# Patient Record
Sex: MALE | Race: BLACK OR AFRICAN AMERICAN | NOT HISPANIC OR LATINO | Employment: OTHER | URBAN - METROPOLITAN AREA
[De-identification: names, ages, dates, MRNs, and addresses within clinical notes are randomized per-mention and may not be internally consistent; named-entity substitution may affect disease eponyms.]

---

## 2018-01-26 ENCOUNTER — HOSPITAL ENCOUNTER (EMERGENCY)
Facility: HOSPITAL | Age: 48
Discharge: HOME | End: 2018-01-26
Attending: EMERGENCY MEDICINE
Payer: MEDICAID

## 2018-01-26 VITALS
OXYGEN SATURATION: 98 % | HEART RATE: 105 BPM | SYSTOLIC BLOOD PRESSURE: 165 MMHG | TEMPERATURE: 98 F | HEIGHT: 65 IN | WEIGHT: 145 LBS | BODY MASS INDEX: 24.16 KG/M2 | RESPIRATION RATE: 18 BRPM | DIASTOLIC BLOOD PRESSURE: 114 MMHG
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
## 2018-01-26 PROCEDURE — 99283 EMERGENCY DEPT VISIT LOW MDM: CPT
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
## 2018-02-21 ENCOUNTER — HOSPITAL ENCOUNTER (EMERGENCY)
Facility: HOSPITAL | Age: 48
Discharge: HOME | End: 2018-02-21
Attending: EMERGENCY MEDICINE
Payer: MEDICAID

## 2018-02-21 VITALS
DIASTOLIC BLOOD PRESSURE: 75 MMHG | RESPIRATION RATE: 18 BRPM | OXYGEN SATURATION: 97 % | TEMPERATURE: 98 F | HEART RATE: 80 BPM | SYSTOLIC BLOOD PRESSURE: 103 MMHG

## 2018-02-21 DIAGNOSIS — S00.93XA CONTUSION OF HEAD, UNSPECIFIED PART OF HEAD, INITIAL ENCOUNTER: ICD-10-CM

## 2018-02-21 DIAGNOSIS — S01.112A LACERATION OF LEFT EYEBROW, INITIAL ENCOUNTER: Primary | ICD-10-CM

## 2018-02-21 PROCEDURE — 25000003 PHARM REV CODE 250: Performed by: EMERGENCY MEDICINE

## 2018-02-21 PROCEDURE — 99283 EMERGENCY DEPT VISIT LOW MDM: CPT | Mod: 25

## 2018-02-21 PROCEDURE — 12051 INTMD RPR FACE/MM 2.5 CM/<: CPT

## 2018-02-21 RX ORDER — OLANZAPINE 10 MG/1
10 TABLET ORAL NIGHTLY
COMMUNITY

## 2018-02-21 RX ORDER — OXYBUTYNIN CHLORIDE 15 MG/1
5 TABLET, EXTENDED RELEASE ORAL DAILY
COMMUNITY

## 2018-02-21 RX ORDER — QUETIAPINE FUMARATE 50 MG/1
50 TABLET, FILM COATED ORAL NIGHTLY
COMMUNITY

## 2018-02-21 RX ORDER — VALPROIC ACID 250 MG/5ML
SOLUTION ORAL 3 TIMES DAILY
COMMUNITY

## 2018-02-21 RX ORDER — PHENYTOIN 125 MG/5ML
SUSPENSION ORAL 3 TIMES DAILY
COMMUNITY

## 2018-02-21 RX ORDER — LIDOCAINE HYDROCHLORIDE 10 MG/ML
10 INJECTION INFILTRATION; PERINEURAL
Status: COMPLETED | OUTPATIENT
Start: 2018-02-21 | End: 2018-02-21

## 2018-02-21 RX ADMIN — LIDOCAINE HYDROCHLORIDE 10 ML: 10 INJECTION, SOLUTION INFILTRATION; PERINEURAL at 12:02
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
## 2018-04-02 ENCOUNTER — HOSPITAL ENCOUNTER (EMERGENCY)
Facility: HOSPITAL | Age: 48
Discharge: HOME | End: 2018-04-02
Attending: EMERGENCY MEDICINE
Payer: MEDICAID

## 2018-04-02 VITALS
DIASTOLIC BLOOD PRESSURE: 96 MMHG | HEART RATE: 121 BPM | SYSTOLIC BLOOD PRESSURE: 141 MMHG | RESPIRATION RATE: 20 BRPM | BODY MASS INDEX: 22.01 KG/M2 | TEMPERATURE: 99 F | WEIGHT: 132.25 LBS | OXYGEN SATURATION: 93 %

## 2018-04-02 DIAGNOSIS — R53.1 WEAKNESS: Primary | ICD-10-CM

## 2018-04-02 PROCEDURE — 99284 EMERGENCY DEPT VISIT MOD MDM: CPT

## 2018-04-02 NOTE — ED PROVIDER NOTES
"SCRIBE #1 NOTE: I, Laura Jag, am scribing for, and in the presence of, South Shannon MD. I have scribed the entire note.      History      Chief Complaint   Patient presents with    Extremity Weakness     pt's caregiver states pt has "generalized weakness, worse on the right side and tachycardia," x1 day       Review of patient's allergies indicates:   Allergen Reactions    Penicillins         HPI   HPI    4/2/2018, 6:21 PM   History obtained from the family member      History of Present Illness: Trey Armneta is a 47 y.o. male patient with PMHx of mental disorder and developmental delay presents to the Emergency Department for weakness which onset gradually this morning. Symptoms are constant and mild in severity. Weakness is located to R upper and lower extremities. Family member reports that pt walks with a walker at home. Pt's family member reports that today at pt's day program ([facility]), reported that pt was showing signs of R sided weakness. No associated sxs reported. HPI limited because pt is non verbal. No further complaints or concerns at this time.     Arrival mode: Personal vehicle    PCP: Josemanuel Pichardo Jr, MD       Past Medical History:  Past Medical History:   Diagnosis Date    Bipolar 1 disorder     Developmental delay     Mental disorder     Mental retardation     Movement disorder     Seizures        Past Surgical History:  No past surgical history on file.      Family History:  No family history on file.    Social History:  Social History     Social History Main Topics    Smoking status: Never Smoker    Smokeless tobacco: Not given    Alcohol use No    Drug use: No    Sexual activity: Not given       ROS   Review of Systems   Unable to perform ROS: Patient nonverbal       Physical Exam      Initial Vitals [04/02/18 1650]   BP Pulse Resp Temp SpO2   (!) 141/96 (!) 121 20 99.3 °F (37.4 °C) (!) 93 %      MAP       111          Physical Exam  Vital signs " and nursing notes reviewed.  Constitutional: Patient is in no acute distress. Baseline.  Eyes: Normal sclera.  ENT: Moist mucosa.  Cardiovascular: Regular rate. Regular rhythm. No murmurs, rubs, or gallops. Distal pulses are 2+ and symmetric  Pulmonary/Chest: No respiratory distress.  Musculoskeletal: Moves all extremities.  Neurological:  Alert, awake. At Baseline per caregiver.  Equal  strength bilaterally. 5/5  Skin: Dry and nl in appearance.  Psychological: Nl affect.      ED Course    Procedures  ED Vital Signs:  Vitals:    04/02/18 1650   BP: (!) 141/96   Pulse: (!) 121   Resp: 20   Temp: 99.3 °F (37.4 °C)   TempSrc: Oral   SpO2: (!) 93%   Weight: 60 kg (132 lb 4.4 oz)       Abnormal Lab Results:  Labs Reviewed - No data to display     All Lab Results:  None     Imaging Results:  Imaging Results          CT Head Without Contrast (Final result)  Result time 04/02/18 18:08:19    Final result by Mireya Kenny MD (04/02/18 18:08:19)                 Impression:     No acute findings.    All CT scans at this facility use dose modulation, iterative reconstruction, and/or weight based dosing when appropriate to reduce radiation dose to as low as reasonably achievable.      Electronically signed by: MIREYA KENNY MD  Date:     04/02/18  Time:    18:08              Narrative:    Exam: Noncontrast Ct scan of the Head    History: Right sided weakness    Findings: No intracranial hemorrhage or acute findings are demonstrated. The visualized paranasal sinuses are clear. The calvarium is intact.                                       The Emergency Provider reviewed the vital signs and test results, which are outlined above.    ED Discussion       6:38 PM: Discussed with pt's guardian all pertinent ED information and results. Discussed pt dx and plan of tx. Gave pt's guardian all f/u and return to the ED instructions. All questions and concerns were addressed at this time. Pt's guardian express understanding of information  and instructions, and is comfortable with plan to discharge. Pt is stable for discharge.    I discussed with patient and/or family/caretaker that evaluation in the ED does not suggest any emergent or life threatening medical conditions requiring immediate intervention beyond what was provided in the ED, and I believe patient is safe for discharge.  Regardless, an unremarkable evaluation in the ED does not preclude the development or presence of a serious of life threatening condition. As such, patient was instructed to return immediately for any worsening or change in current symptoms.        ED Medication(s):  Medications - No data to display    Discharge Medication List as of 4/2/2018  6:29 PM          Follow-up Information     Josemanuel Pichardo Jr, MD.    Specialty:  Internal Medicine  Contact information:  [address] [facility] [address] [phone]                     Medical Decision Making    Medical Decision Making:   Clinical Tests:   Radiological Study: Ordered and Reviewed           Scribe Attestation:   Scribe #1: I performed the above scribed service and the documentation accurately describes the services I performed. I attest to the accuracy of the note.    Attending:   Physician Attestation Statement for Scribe #1: I, South Shannon MD, personally performed the services described in this documentation, as scribed by Laura Rm, in my presence, and it is both accurate and complete.          Clinical Impression       ICD-10-CM ICD-9-CM   1. Weakness R53.1 780.79       Disposition:   Disposition: Discharged  Condition: Stable           South Shannon MD  04/02/18 2010

## 2018-12-05 ENCOUNTER — HOSPITAL ENCOUNTER (EMERGENCY)
Facility: HOSPITAL | Age: 48
Discharge: HOME | End: 2018-12-05
Attending: EMERGENCY MEDICINE
Payer: MEDICAID

## 2018-12-05 VITALS
DIASTOLIC BLOOD PRESSURE: 77 MMHG | BODY MASS INDEX: 26.99 KG/M2 | SYSTOLIC BLOOD PRESSURE: 126 MMHG | WEIGHT: 162.19 LBS | RESPIRATION RATE: 16 BRPM | HEART RATE: 82 BPM | OXYGEN SATURATION: 94 % | TEMPERATURE: 98 F

## 2018-12-05 DIAGNOSIS — S90.511A ABRASION OF RIGHT ANKLE, INITIAL ENCOUNTER: ICD-10-CM

## 2018-12-05 DIAGNOSIS — S93.491A SPRAIN OF OTHER LIGAMENT OF RIGHT ANKLE, INITIAL ENCOUNTER: ICD-10-CM

## 2018-12-05 DIAGNOSIS — M25.571 RIGHT ANKLE PAIN: ICD-10-CM

## 2018-12-05 DIAGNOSIS — M79.671 RIGHT FOOT PAIN: Primary | ICD-10-CM

## 2018-12-05 LAB
ALBUMIN SERPL BCP-MCNC: 3.2 G/DL
ALP SERPL-CCNC: 65 U/L
ALT SERPL W/O P-5'-P-CCNC: 25 U/L
ANION GAP SERPL CALC-SCNC: 10 MMOL/L
AST SERPL-CCNC: 34 U/L
BASOPHILS # BLD AUTO: 0.01 K/UL
BASOPHILS NFR BLD: 0.1 %
BILIRUB SERPL-MCNC: 0.3 MG/DL
BUN SERPL-MCNC: 15 MG/DL
CALCIUM SERPL-MCNC: 8.8 MG/DL
CHLORIDE SERPL-SCNC: 109 MMOL/L
CO2 SERPL-SCNC: 25 MMOL/L
CREAT SERPL-MCNC: 1.2 MG/DL
DIFFERENTIAL METHOD: ABNORMAL
EOSINOPHIL # BLD AUTO: 0.1 K/UL
EOSINOPHIL NFR BLD: 0.8 %
ERYTHROCYTE [DISTWIDTH] IN BLOOD BY AUTOMATED COUNT: 14.1 %
EST. GFR  (AFRICAN AMERICAN): >60 ML/MIN/1.73 M^2
EST. GFR  (NON AFRICAN AMERICAN): >60 ML/MIN/1.73 M^2
GLUCOSE SERPL-MCNC: 109 MG/DL
HCT VFR BLD AUTO: 41.7 %
HGB BLD-MCNC: 14.4 G/DL
LYMPHOCYTES # BLD AUTO: 1.9 K/UL
LYMPHOCYTES NFR BLD: 24.7 %
MCH RBC QN AUTO: 33.4 PG
MCHC RBC AUTO-ENTMCNC: 34.5 G/DL
MCV RBC AUTO: 97 FL
MONOCYTES # BLD AUTO: 0.7 K/UL
MONOCYTES NFR BLD: 9.7 %
NEUTROPHILS # BLD AUTO: 4.9 K/UL
NEUTROPHILS NFR BLD: 64.7 %
PLATELET # BLD AUTO: 128 K/UL
PMV BLD AUTO: 12.3 FL
POTASSIUM SERPL-SCNC: 4.1 MMOL/L
PROT SERPL-MCNC: 6.9 G/DL
RBC # BLD AUTO: 4.31 M/UL
SODIUM SERPL-SCNC: 144 MMOL/L
WBC # BLD AUTO: 7.53 K/UL

## 2018-12-05 PROCEDURE — 99284 EMERGENCY DEPT VISIT MOD MDM: CPT | Mod: 25

## 2018-12-05 PROCEDURE — 36415 COLL VENOUS BLD VENIPUNCTURE: CPT

## 2018-12-05 PROCEDURE — 80053 COMPREHEN METABOLIC PANEL: CPT

## 2018-12-05 PROCEDURE — 85025 COMPLETE CBC W/AUTO DIFF WBC: CPT

## 2018-12-06 NOTE — DISCHARGE INSTRUCTIONS
Keep wound clean and dry with bulky dressing. Watch for signs of infection, redness, drainage, pain, swelling.

## 2018-12-06 NOTE — ED PROVIDER NOTES
SCRIBE #1 NOTE: I, Aida Hawkins, am scribing for, and in the presence of, Lucía Barreto NP. I have scribed the entire note.       History     Chief Complaint   Patient presents with    Gait Problem     pt is nonverbal due to seizures and MR. Castillo is coming from group home. Group home employee reports that pt has been leaning to the right since monday and they noticed a cut on his right ankle. Requesting check up.     Review of patient's allergies indicates:   Allergen Reactions    Penicillins          History of Present Illness     HPI    12/5/2018, 8:09 PM  History obtained from the caretaker at pt's Group Home  HPI limited secondary to patient being nonverbal      History of Present Illness: Trey Armenta is a 48 y.o. male patient with PMhx of developmental delay and seizures from a Group Home who presents to the Emergency Department for evaluation of a gait problem. Pt's caregiver states that patient began leaning to the R while ambulating today. The group home staff noticed the abrasion to his lateral R ankle 2 days ago but are not sure if patient is leaning to the R secondary to ankle pain. The caretaker does not know how patient scraped his ankle. Pt has frequent falls secondary to balance issues but has fallen more today. He is complaint with Dilantin and Depakene which he takes for seizures and to the caretaker's knowledge, he has not had a seizure recently.       Arrival mode: Personal vehicle     PCP: Josemanuel Pichardo Jr, MD        Past Medical History:  Past Medical History:   Diagnosis Date    Bipolar 1 disorder     Developmental delay     Mental disorder     Mental retardation     Movement disorder     Seizures        Past Surgical History:  History reviewed. No pertinent past surgical history.    Family History:  History reviewed. No pertinent family history.    Social History:  Social History     Tobacco Use    Smoking status: Never Smoker   Substance and Sexual Activity    Alcohol use:  No    Drug use: No    Sexual activity: Unknown        Review of Systems     Review of Systems   Unable to perform ROS: Patient nonverbal   Skin: Positive for wound (abrasion to R lateral ankle).      Physical Exam     Initial Vitals [12/05/18 1955]   BP Pulse Resp Temp SpO2   130/84 87 16 97.5 °F (36.4 °C) 95 %      MAP       --          Physical Exam  Nursing Notes and Vital Signs Reviewed.  Constitutional: Patient is in no acute distress. Well-nourished.  Head: Atraumatic. Normocephalic.  Eyes:No scleral icterus.  ENT: Mucous membranes are moist.    Neck: Supple.   Cardiovascular: Regular rate. Regular rhythm. No murmurs, rubs, or gallops. Distal pulses are 2+ and symmetric.  Pulmonary/Chest: No respiratory distress. Clear to auscultation bilaterally. No wheezing or rales.  Abdominal: Non-distended. Good bowel sounds.  Musculoskeletal: Moves all extremities equally.  R foot/ankle: There is R foot swelling. No obvious deformity. No bony tenderness over navicular.  No tenderness over the base of the 5th metatarsal.  No proximal fibular tenderness. He is able to bear weight. Sensation is normal. Distal capillary refill takes less than 2 seconds.  PT and DP pulses are 2+ bilaterally.  Skin: Warm and dry. Abrasion over the R lateral ankle.  Neurological:  Awake. At baseline mental status, per caretaker. Nonverbal at baseline. Able to follow commands. No acute focal neurological deficits are appreciated.  Psychiatric: Normal affect. Good eye contact.     ED Course   Orthopedic Injury  Date/Time: 12/5/2018 9:55 PM  Performed by: Lucía Barreto NP  Authorized by: Renetta Leahy Do, MD     Consent Done?:  Yes  Universal Protocol:     Consent given by: caretaker.  Injury:     Injury location:  Foot    Location details:  Right foot    Injury type:  Soft tissue      Pre-procedure assessment:     Neurovascular status: Neurovascularly intact      Distal perfusion: normal      Neurological function: normal      Range of  motion: normal      Local anesthesia used?: No      Patient sedated?: No        Selections made in this section will also lock the Injury type section above.:     Immobilization: post-op shoe.    Complications: No      Specimens: No      Implants: No    Post-procedure assessment:     Neurovascular status: Neurovascularly intact      Distal perfusion: normal      Neurological function: normal      Range of motion: splinted      Patient tolerance:  Patient tolerated the procedure well with no immediate complications      ED Vital Signs:  Vitals:    12/05/18 1955 12/05/18 2002   BP: 130/84    Pulse: 87    Resp: 16    Temp: 97.5 °F (36.4 °C)    TempSrc: Axillary    SpO2: 95%    Weight:  73.6 kg (162 lb 3.2 oz)       Abnormal Lab Results:  Labs Reviewed   CBC W/ AUTO DIFFERENTIAL - Abnormal; Notable for the following components:       Result Value    RBC 4.31 (*)     MCH 33.4 (*)     Platelets 128 (*)     All other components within normal limits   COMPREHENSIVE METABOLIC PANEL - Abnormal; Notable for the following components:    Albumin 3.2 (*)     All other components within normal limits        All Lab Results:  Results for orders placed or performed during the hospital encounter of 12/05/18   CBC auto differential   Result Value Ref Range    WBC 7.53 3.90 - 12.70 K/uL    RBC 4.31 (L) 4.60 - 6.20 M/uL    Hemoglobin 14.4 14.0 - 18.0 g/dL    Hematocrit 41.7 40.0 - 54.0 %    MCV 97 82 - 98 fL    MCH 33.4 (H) 27.0 - 31.0 pg    MCHC 34.5 32.0 - 36.0 g/dL    RDW 14.1 11.5 - 14.5 %    Platelets 128 (L) 150 - 350 K/uL    MPV 12.3 9.2 - 12.9 fL    Gran # (ANC) 4.9 1.8 - 7.7 K/uL    Lymph # 1.9 1.0 - 4.8 K/uL    Mono # 0.7 0.3 - 1.0 K/uL    Eos # 0.1 0.0 - 0.5 K/uL    Baso # 0.01 0.00 - 0.20 K/uL    Gran% 64.7 38.0 - 73.0 %    Lymph% 24.7 18.0 - 48.0 %    Mono% 9.7 4.0 - 15.0 %    Eosinophil% 0.8 0.0 - 8.0 %    Basophil% 0.1 0.0 - 1.9 %    Differential Method Automated    Comprehensive metabolic panel   Result Value Ref Range     Sodium 144 136 - 145 mmol/L    Potassium 4.1 3.5 - 5.1 mmol/L    Chloride 109 95 - 110 mmol/L    CO2 25 23 - 29 mmol/L    Glucose 109 70 - 110 mg/dL    BUN, Bld 15 6 - 20 mg/dL    Creatinine 1.2 0.5 - 1.4 mg/dL    Calcium 8.8 8.7 - 10.5 mg/dL    Total Protein 6.9 6.0 - 8.4 g/dL    Albumin 3.2 (L) 3.5 - 5.2 g/dL    Total Bilirubin 0.3 0.1 - 1.0 mg/dL    Alkaline Phosphatase 65 55 - 135 U/L    AST 34 10 - 40 U/L    ALT 25 10 - 44 U/L    Anion Gap 10 8 - 16 mmol/L    eGFR if African American >60 >60 mL/min/1.73 m^2    eGFR if non African American >60 >60 mL/min/1.73 m^2         Imaging Results:  Imaging Results          CT Head Without Contrast (Final result)  Result time 12/05/18 21:22:00    Final result by Thai Leon MD (12/05/18 21:22:00)                 Impression:      1.  Negative for acute intracranial process. Negative for hemorrhage, or skull fracture.    2.  Stable findings as noted above.    All CT scans at this facility are performed  using dose modulation techniques as appropriate to performed exam including the following:  automated exposure control; adjustment of mA and/or kV according to the patients size (this includes techniques or standardized protocols for targeted exams where dose is matched to indication/reason for exam: i.e. extremities or head);  iterative reconstruction technique.      Electronically signed by: Thai Leon MD  Date:    12/05/2018  Time:    21:22             Narrative:    EXAMINATION:  CT HEAD WITHOUT CONTRAST    CLINICAL HISTORY:  leaning to right; transient alteration of awareness    TECHNIQUE:  Axial images through the brain and posterior fossa were obtained without the use of IV contrast.  Sagittal and coronal reconstructions are provided for review.    COMPARISON:  April 2, 2018    FINDINGS:  Motion artifact is present on a number of images.  Subtle abnormalities could be overlooked.  The ventricles are midline and the CSF spaces are normal for age with slight  asymmetry again seen, left greater than right.  The gray-white matter junction is well preserved. Negative for intracranial vascular abnormalities. Negative for mass, mass effect, cerebral edema, hemorrhage or abnormal fluid collections.  Intracranial atherosclerotic disease.    The skull and scalp are intact.    The   paranasal sinuses, mastoid air cells, middle ears and ear canals are clear. The globes are intact.                               X-Ray Foot Complete Right (Final result)  Result time 12/05/18 21:04:16    Final result by Thai Leon MD (12/05/18 21:04:16)                 Impression:      1.  Negative for acute process involving the visualized osseous structures.    2.  Probable radiopaque foreign body measuring 4 mm within the lateral plantar midfoot soft tissues.  Clinical correlation is advised.    3.  Incidental findings as described above.      Electronically signed by: Thai Leon MD  Date:    12/05/2018  Time:    21:04             Narrative:    EXAMINATION:  XR ANKLE COMPLETE 3 VIEW RIGHT; XR FOOT COMPLETE 3 VIEW RIGHT    CLINICAL HISTORY:  Pain in right ankle and joints of right foot; Pain in right foot    TECHNIQUE:  AP, lateral, and oblique images of the right ankle and right foot were performed.    COMPARISON:  None    FINDINGS:  The mortise is intact.  The talar dome is smooth.  Soft tissue swelling overlies the lateral malleolus.    There appears to be a radiopaque foreign body within the lateral and plantar midfoot soft tissues at the level of the base of the 5th metatarsal bone.  Measuring 4 mm.    Forefoot osseous structures are well maintained.    Negative for fracture or dislocation.  Vascular calcifications.                               X-Ray Ankle Complete Right (Final result)  Result time 12/05/18 21:04:16    Final result by Thai Leon MD (12/05/18 21:04:16)                 Impression:      1.  Negative for acute process involving the visualized osseous structures.    2.   Probable radiopaque foreign body measuring 4 mm within the lateral plantar midfoot soft tissues.  Clinical correlation is advised.    3.  Incidental findings as described above.      Electronically signed by: Thai Leon MD  Date:    12/05/2018  Time:    21:04             Narrative:    EXAMINATION:  XR ANKLE COMPLETE 3 VIEW RIGHT; XR FOOT COMPLETE 3 VIEW RIGHT    CLINICAL HISTORY:  Pain in right ankle and joints of right foot; Pain in right foot    TECHNIQUE:  AP, lateral, and oblique images of the right ankle and right foot were performed.    COMPARISON:  None    FINDINGS:  The mortise is intact.  The talar dome is smooth.  Soft tissue swelling overlies the lateral malleolus.    There appears to be a radiopaque foreign body within the lateral and plantar midfoot soft tissues at the level of the base of the 5th metatarsal bone.  Measuring 4 mm.    Forefoot osseous structures are well maintained.    Negative for fracture or dislocation.  Vascular calcifications.                                      The Emergency Provider reviewed the vital signs and test results, which are outlined above.     ED Discussion     9:55 PM: patient's caretaker has been informed of lab and imaging results. Imaging does not reveal any fxs or dislocated but does reveal a small foreign body in the plantar surface of the foot. Discussed pt dx and plan of tx. Gave caretaker all f/u and return to the ED instructions. All questions and concerns were addressed at this time. Caretaker expresses understanding of information and instructions, and is comfortable with plan to discharge. Pt is stable for discharge.    I discussed with patient and/or family/caretaker that evaluation in the ED does not suggest any emergent or life threatening medical conditions requiring immediate intervention beyond what was provided in the ED, and I believe patient is safe for discharge.  Regardless, an unremarkable evaluation in the ED does not preclude the  development or presence of a serious of life threatening condition. As such, patient was instructed to return immediately for any worsening or change in current symptoms.    ED Medication(s):  Medications - No data to display    Current Discharge Medication List   There are no discharge medications for this patient.       Follow-up Information     Josemanuel Pichardo Jr, MD In 2 days.    Specialty:  Internal Medicine  Why:  Follow up with your doctor for further evaluation, Return to ED for any concerns.  Contact information:  [address] [place] [address] [phone]                         Medical Decision Making:   Clinical Tests:   Lab Tests: Ordered and Reviewed  Radiological Study: Ordered and Reviewed             Scribe Attestation:   Scribe #1: I performed the above scribed service and the documentation accurately describes the services I performed. I attest to the accuracy of the note.     Attending:   Physician Attestation Statement for Scribe #1: I, Lucía Barreto NP, personally performed the services described in this documentation, as scribed by Aida Hawkins, in my presence, and it is both accurate and complete.           Clinical Impression       ICD-10-CM ICD-9-CM   1. Right foot pain M79.671 729.5   2. Right ankle pain M25.571 719.47   3. Sprain of other ligament of right ankle, initial encounter S93.491A 845.09   4. Abrasion of right ankle, initial encounter S90.511A 916.0       Disposition:   Disposition: Discharged  Condition: Stable

## 2018-12-06 NOTE — ED NOTES
Straight stick for bloodwork, pt tolerated well.  Staff assisted in holding pt's RUE in anterior facing position, pt cooperated and tolerated well without fighting.

## 2018-12-06 NOTE — ED PROVIDER NOTES
Encounter Date: 12/5/2018       History     Chief Complaint   Patient presents with    Gait Problem     pt is nonverbal due to seizures and MR. Pt is coming from group home. Group home employee reports that pt has been leaning to the right since monday and they noticed a cut on his right ankle. Requesting check up.     History of Present Illness: Trey Armenta 49 y/o nonverbal Male patient who presents to the Emergency Department for leaning to the right x 5 days and an abrasion noted to sole of right foot which onset over 5 days. Symptoms are constant for the past 5 days. The group home staff reports no facial droop or weakness. She reports only leaning when upright walking. She also denies any seizuere like activity, no puss, redness, or drainage from foot. No further complaints or concerns at this time. Pt has a hx of MR, seizures, and is nonverbal.             Review of patient's allergies indicates:   Allergen Reactions    Penicillins      Past Medical History:   Diagnosis Date    Bipolar 1 disorder     Developmental delay     Mental disorder     Mental retardation     Movement disorder     Seizures      No past surgical history on file.  No family history on file.  Social History     Tobacco Use    Smoking status: Never Smoker   Substance Use Topics    Alcohol use: No    Drug use: No     Review of Systems   Unable to perform ROS: Patient nonverbal       Physical Exam     Initial Vitals [12/05/18 1955]   BP Pulse Resp Temp SpO2   130/84 87 16 97.5 °F (36.4 °C) 95 %      MAP       --         Physical Exam    Constitutional: Vital signs are normal. He appears well-developed. No distress.   HENT:   Head: Normocephalic and atraumatic.   Eyes: Conjunctivae, EOM and lids are normal. Pupils are equal, round, and reactive to light.   Neck: Normal range of motion. Neck supple.   Cardiovascular: Normal rate, regular rhythm and intact distal pulses.   No swelling or edema noted to bilateral lower extremities.     Pulmonary/Chest: Effort normal and breath sounds normal. No respiratory distress.   Abdominal: Soft. Normal appearance and bowel sounds are normal. There is no rigidity and no guarding.   Neurological: He is alert. He has normal strength. No sensory deficit. He exhibits normal muscle tone. He displays no seizure activity.   Pt is nonverbal and at baseline per group home staff member at .    Skin: Skin is warm and dry.              ED Course   Procedures  Labs Reviewed   CBC W/ AUTO DIFFERENTIAL - Abnormal; Notable for the following components:       Result Value    RBC 4.31 (*)     MCH 33.4 (*)     Platelets 128 (*)     All other components within normal limits   COMPREHENSIVE METABOLIC PANEL - Abnormal; Notable for the following components:    Albumin 3.2 (*)     All other components within normal limits          Imaging Results          CT Head Without Contrast (Final result)  Result time 12/05/18 21:22:00    Final result by Thai Leon MD (12/05/18 21:22:00)                 Impression:      1.  Negative for acute intracranial process. Negative for hemorrhage, or skull fracture.    2.  Stable findings as noted above.    All CT scans at this facility are performed  using dose modulation techniques as appropriate to performed exam including the following:  automated exposure control; adjustment of mA and/or kV according to the patients size (this includes techniques or standardized protocols for targeted exams where dose is matched to indication/reason for exam: i.e. extremities or head);  iterative reconstruction technique.      Electronically signed by: Thai Leon MD  Date:    12/05/2018  Time:    21:22             Narrative:    EXAMINATION:  CT HEAD WITHOUT CONTRAST    CLINICAL HISTORY:  leaning to right; transient alteration of awareness    TECHNIQUE:  Axial images through the brain and posterior fossa were obtained without the use of IV contrast.  Sagittal and coronal reconstructions are provided for  review.    COMPARISON:  April 2, 2018    FINDINGS:  Motion artifact is present on a number of images.  Subtle abnormalities could be overlooked.  The ventricles are midline and the CSF spaces are normal for age with slight asymmetry again seen, left greater than right.  The gray-white matter junction is well preserved. Negative for intracranial vascular abnormalities. Negative for mass, mass effect, cerebral edema, hemorrhage or abnormal fluid collections.  Intracranial atherosclerotic disease.    The skull and scalp are intact.    The   paranasal sinuses, mastoid air cells, middle ears and ear canals are clear. The globes are intact.                               X-Ray Foot Complete Right (Final result)  Result time 12/05/18 21:04:16    Final result by Thai Leon MD (12/05/18 21:04:16)                 Impression:      1.  Negative for acute process involving the visualized osseous structures.    2.  Probable radiopaque foreign body measuring 4 mm within the lateral plantar midfoot soft tissues.  Clinical correlation is advised.    3.  Incidental findings as described above.      Electronically signed by: Thai Leon MD  Date:    12/05/2018  Time:    21:04             Narrative:    EXAMINATION:  XR ANKLE COMPLETE 3 VIEW RIGHT; XR FOOT COMPLETE 3 VIEW RIGHT    CLINICAL HISTORY:  Pain in right ankle and joints of right foot; Pain in right foot    TECHNIQUE:  AP, lateral, and oblique images of the right ankle and right foot were performed.    COMPARISON:  None    FINDINGS:  The mortise is intact.  The talar dome is smooth.  Soft tissue swelling overlies the lateral malleolus.    There appears to be a radiopaque foreign body within the lateral and plantar midfoot soft tissues at the level of the base of the 5th metatarsal bone.  Measuring 4 mm.    Forefoot osseous structures are well maintained.    Negative for fracture or dislocation.  Vascular calcifications.                               X-Ray Ankle Complete  Right (Final result)  Result time 12/05/18 21:04:16    Final result by Thai Leon MD (12/05/18 21:04:16)                 Impression:      1.  Negative for acute process involving the visualized osseous structures.    2.  Probable radiopaque foreign body measuring 4 mm within the lateral plantar midfoot soft tissues.  Clinical correlation is advised.    3.  Incidental findings as described above.      Electronically signed by: Thai Leon MD  Date:    12/05/2018  Time:    21:04             Narrative:    EXAMINATION:  XR ANKLE COMPLETE 3 VIEW RIGHT; XR FOOT COMPLETE 3 VIEW RIGHT    CLINICAL HISTORY:  Pain in right ankle and joints of right foot; Pain in right foot    TECHNIQUE:  AP, lateral, and oblique images of the right ankle and right foot were performed.    COMPARISON:  None    FINDINGS:  The mortise is intact.  The talar dome is smooth.  Soft tissue swelling overlies the lateral malleolus.    There appears to be a radiopaque foreign body within the lateral and plantar midfoot soft tissues at the level of the base of the 5th metatarsal bone.  Measuring 4 mm.    Forefoot osseous structures are well maintained.    Negative for fracture or dislocation.  Vascular calcifications.                                 Medical Decision Making:   Clinical Tests:   Lab Tests: Ordered and Reviewed  The following lab test(s) were unremarkable: CBC and CMP  Radiological Study: Ordered and Reviewed  ED Management:  Films visualized, interpreted by radiologist, I, Lucía Barreto NP agree with findings. I discussed with pt sitter findings of 4mm foreign body, full exam on foot and informed no have PCP reevaluate if pt continues to complain of pain.                       ED Course as of Dec 28 1109   Wed Dec 05, 2018   2128 X-Ray Foot Complete Right [JL]      ED Course User Index  [JL] Lucía Barreto NP     Clinical Impression:   The primary encounter diagnosis was Right foot pain. Diagnoses of Right ankle pain, Sprain  of other ligament of right ankle, initial encounter, and Abrasion of right ankle, initial encounter were also pertinent to this visit.    10:19 PM: Re-evaluated pt. Pt is resting comfortably and is in no acute distress at present time.  D/w pt and sitter all pertinent results without any acute findings on CT head, blood work, or imaging. I informed pt sitter of possible foreign body in lateral aspect of left foot. The foot was completely examined, no recent abrasions or cuts noted area where foreign body seen on xray. Informed sitter of infection precautions, symptoms, and when to have pt reevaluated. The pt remains at his neurovascular baseline, no acute changes noted, pt is stable for discharge home with sitter. D/w pt any concerns expressed at this time. Answered all questions. Pt expresses understanding at this time.    Disposition:   Disposition: Discharged  Condition: Stable    Current Discharge Medication List        Follow-up Information     Josemanuel Pichardo Jr, MD In 2 days.    Specialty:  Internal Medicine  Why:  Follow up with your doctor for further evaluation, Return to ED for any concerns.  Contact information:  [id number] [place] [id number] [phone]                                  Lucía Barreto NP  12/05/18 2224       Lucía Barreto NP  12/15/18 1746       Lucía Barreto NP  12/28/18 1091

## 2020-03-12 ENCOUNTER — HOSPITAL ENCOUNTER (EMERGENCY)
Facility: HOSPITAL | Age: 50
Discharge: HOME | End: 2020-03-12
Attending: EMERGENCY MEDICINE
Payer: MEDICAID

## 2020-03-12 VITALS
TEMPERATURE: 98 F | RESPIRATION RATE: 18 BRPM | HEART RATE: 90 BPM | SYSTOLIC BLOOD PRESSURE: 149 MMHG | DIASTOLIC BLOOD PRESSURE: 98 MMHG | OXYGEN SATURATION: 98 %

## 2020-03-12 DIAGNOSIS — S09.90XA INJURY OF HEAD, INITIAL ENCOUNTER: ICD-10-CM

## 2020-03-12 DIAGNOSIS — S01.112A LEFT EYELID LACERATION, INITIAL ENCOUNTER: ICD-10-CM
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
## 2020-03-12 PROCEDURE — 25000003 PHARM REV CODE 250: Performed by: NURSE PRACTITIONER

## 2020-03-12 PROCEDURE — 12011 RPR F/E/E/N/L/M 2.5 CM/<: CPT

## 2020-03-12 PROCEDURE — 99283 EMERGENCY DEPT VISIT LOW MDM: CPT | Mod: 25

## 2020-03-12 RX ORDER — IBUPROFEN 600 MG/1
600 TABLET ORAL
Status: COMPLETED | OUTPATIENT
Start: 2020-03-12 | End: 2020-03-12

## 2020-03-12 RX ADMIN — IBUPROFEN 600 MG: 600 TABLET, FILM COATED ORAL at 08:03

## 2020-03-12 RX ADMIN — Medication: at 08:03

## 2020-03-13 NOTE — ED PROVIDER NOTES
Encounter Date: 3/12/2020       History     Chief Complaint   Patient presents with    Fall     fall from bed to laminate floor PTA; lac to left eyebrow     HPI  Review of patient's allergies indicates:   Allergen Reactions    Penicillins      Past Medical History:   Diagnosis Date    Bipolar 1 disorder     Developmental delay     Mental disorder     Mental retardation     Movement disorder     Seizures      No past surgical history on file.  No family history on file.  Social History     Tobacco Use    Smoking status: Never Smoker   Substance Use Topics    Alcohol use: No    Drug use: No     Review of Systems    Physical Exam     Initial Vitals [03/12/20 1914]   BP Pulse Resp Temp SpO2   (!) 159/102 90 17 98.3 °F (36.8 °C) 97 %      MAP       --         Physical Exam    ED Course   Procedures  Labs Reviewed   HIV 1 / 2 ANTIBODY          Imaging Results    None                                          Clinical Impression:   {Add your Clinical Impression here. If you haven't documented one yet, please pend the note, finalize a Clinical Impression, and refresh your note before signing.:[id number]}

## 2020-03-13 NOTE — ED PROVIDER NOTES
SCRIBE #1 NOTE: I, Shiela Arriola, am scribing for, and in the presence of, Lucía Barreto NP. I have scribed the entire note.         History      Chief Complaint   Patient presents with    Fall     fall from bed to laminate floor PTA; lac to left eyebrow       Review of patient's allergies indicates:   Allergen Reactions    Penicillins         HPI   HPI    3/12/2020, 8:08 PM   History obtained from the adult caretaker and patient      The history is limited due to the patient's mental status.      History of Present Illness: Trey Armenta is a 49 y.o. male patient who presents to the Emergency Department for an evaluation of a fall which onset abruptly just PTA when the patient's caretaker reports that the patient fell from his bed onto the laminate wing below while dancing. He presents now to the ED noting a laceration above the left eye. Symptoms are constant and moderate in severity. No mitigating or exacerbating factors reported. Associated sxs include head trauma and bleeding. Patient's caretaker denies any LOC, change in activity, vomiting, excessive bleeding, feverand all other sxs at this time. No prior treatment reported.  No further complaints or concerns at this time. The patient's caretaker reports that the patient's tetanus is UTD.      Arrival mode: Personal vehicle    PCP: Josemanuel Pichardo Jr, MD       Past Medical History:  Past Medical History:   Diagnosis Date    Bipolar 1 disorder     Developmental delay     Mental disorder     Mental retardation     Movement disorder     Seizures        Past Surgical History:  History reviewed. No pertinent past surgical history.       Family History:  History reviewed. No pertinent family history.       Social History:  Social History     Tobacco Use    Smoking status: Never Smoker   Substance and Sexual Activity    Alcohol use: No    Drug use: No    Sexual activity: Unknown       ROS   Review of Systems   Unable to perform ROS: Other    Constitutional: Negative for activity change and fever.        Positive for head trauma.   HENT: Negative for trouble swallowing.    Gastrointestinal: Negative for vomiting.   Skin: Positive for wound (laceration to the left eyebrow). Negative for rash.   Hematological: Does not bruise/bleed easily.     Unable to perform ROS due to baseline mental status      Physical Exam      Initial Vitals [03/12/20 1914]   BP Pulse Resp Temp SpO2   (!) 159/102 90 17 98.3 °F (36.8 °C) 97 %      MAP       --                    Physical Exam  Nursing Notes and Vital Signs Reviewed.  Constitutional: Patient is in no acute distress. Well-developed and well-nourished.  Head: Atraumatic. Normocephalic.  Eyes: PERRL. EOM intact. Conjunctivae are not pale. No scleral icterus.  ENT: Mucous membranes are moist. Oropharynx is clear and symmetric.    Neck: Supple. Full ROM. No lymphadenopathy.  Cardiovascular: Regular rate. Regular rhythm. No murmurs, rubs, or gallops. Distal pulses are 2+ and symmetric.  Pulmonary/Chest: No respiratory distress. Clear to auscultation bilaterally. No wheezing or rales.  Abdominal: Soft and non-distended.  There is no tenderness.  No rebound, guarding, or rigidity. Good bowel sounds.  Genitourinary: No CVA tenderness  Musculoskeletal: Moves all extremities. No obvious deformities. No edema. No calf tenderness.  Skin: There is a jagged laceration on the left upper eyelid; the bleeding is controlled. Please see picture above.  Neurological:  Alert, awake, and appropriate.  Normal speech.  No acute focal neurological deficits are appreciated.  Psychiatric: Normal affect. Good eye contact. Appropriate in content.    ED Course    Lac Repair  Date/Time: 3/12/2020 10:03 PM  Performed by: Lucía Barreto NP  Authorized by: Lucía Barreto NP   Body area: head/neck  Location details: left eyelid  Laceration length: 1.5 cm  Foreign bodies: no foreign bodies  Tendon involvement: none  Nerve involvement:  none  Vascular damage: yes    Anesthesia:  Local Anesthetic: LET (lido,epi,tetracaine)  Patient sedated: no  Irrigation solution: saline  Amount of cleaning: standard  Debridement: none  Degree of undermining: none  Skin closure: Steri-Strips (Dermabond)  Approximation: loose  Approximation difficulty: simple  Dressing: open (no dressing)  Patient tolerance: Patient tolerated the procedure well with no immediate complications        ED Vital Signs:  Vitals:    03/12/20 1914 03/12/20 2220   BP: (!) 159/102 (!) 149/98   Pulse: 90    Resp: 17 18   Temp: 98.3 °F (36.8 °C) 98.2 °F (36.8 °C)   TempSrc: Oral Oral   SpO2: 97% 98%       Abnormal Lab Results:  Labs Reviewed - No data to display     All Lab Results: None    Imaging Results:  Imaging Results    None                 The Emergency Provider reviewed the vital signs and test results, which are outlined above.    ED Discussion     10:07 PM: Reassessed pt at this time.   Discussed with pt's caretaker all pertinent ED information. Discussed pt's dx and plan of tx. Gave patient's caretaker all f/u and return to the ED instructions. All questions and concerns were addressed at this time. Pt's caretaker expresses understanding of information and instructions, and is comfortable with plan to discharge. Pt is stable for discharge.    I discussed wound care precautions with patient's caretaker; specifically that all wounds have risk of infection despite efforts to cleanse and debride the wound; and there is a risk of an occult foreign body (and thus increased risk of infection) despite a negative examination.  I discussed with patient need to return for any signs of infection, specifically redness, increased pain, fever, drainage of pus, or any concern, immediately.    I discussed with patient and/or family/caretaker that evaluation in the ED does not suggest any emergent or life threatening medical conditions requiring immediate intervention beyond what was provided in the  ED, and I believe patient is safe for discharge.  Regardless, an unremarkable evaluation in the ED does not preclude the development or presence of a serious of life threatening condition. As such, patient was instructed to return immediately for any worsening or change in current symptoms.           ED Medication(s):  Medications   LETS (lidocaine-tetracaine-epinephrine) gel solution ( Topical (Top) Given 3/12/20 2042)   ibuprofen tablet 600 mg (600 mg Oral Given 3/12/20 2039)       Follow-up Information     Josemanuel Pichardo Jr, MD In 3 days.    Specialty:  Internal Medicine  Why:  Follow up with your doctor for further evaluation, Return to ED for any concerns.  Contact information:  [id number] [place] [address] [phone]                  Discharge Medication List as of 3/12/2020 10:04 PM              Medical Decision Making              Scribe Attestation:   Scribe #1: I performed the above scribed service and the documentation accurately describes the services I performed. I attest to the accuracy of the note.    Attending:   Physician Attestation Statement for Scribe #1: I, Lucía Barreto NP, personally performed the services described in this documentation, as scribed by Shiela Arriola, in my presence, and it is both accurate and complete.          Clinical Impression       ICD-10-CM ICD-9-CM   1. Fall, initial encounter W19.XXXA E888.9   2. Injury of head, initial encounter S09.90XA 959.01   3. Left eyelid laceration, initial encounter S01.112A 870.8       Disposition:   Disposition: Discharged  Condition: Stable         Lucía Barreto NP  03/14/20 1242

## 2020-03-13 NOTE — DISCHARGE INSTRUCTIONS
Dermabond instructions: Keep area clean, Do not submerge under water, Do not use neosporin on the dermabond.  Dermabond will sluff off in 7 to 10 days.  Have wound reevaluate for any signs of infection

## 2020-06-24 ENCOUNTER — HOSPITAL ENCOUNTER (EMERGENCY)
Facility: HOSPITAL | Age: 50
Discharge: HOME | End: 2020-06-24
Attending: EMERGENCY MEDICINE
Payer: MEDICAID

## 2020-06-24 VITALS
OXYGEN SATURATION: 97 % | SYSTOLIC BLOOD PRESSURE: 150 MMHG | HEART RATE: 82 BPM | BODY MASS INDEX: 27.32 KG/M2 | WEIGHT: 164 LBS | HEIGHT: 65 IN | DIASTOLIC BLOOD PRESSURE: 88 MMHG | TEMPERATURE: 98 F | RESPIRATION RATE: 16 BRPM

## 2020-06-24 DIAGNOSIS — S09.93XA FACIAL INJURY, INITIAL ENCOUNTER: ICD-10-CM
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
## 2020-06-24 DIAGNOSIS — S01.112A LACERATION OF LEFT EYEBROW, INITIAL ENCOUNTER: ICD-10-CM

## 2020-06-24 PROCEDURE — 25000003 PHARM REV CODE 250: Performed by: NURSE PRACTITIONER

## 2020-06-24 PROCEDURE — 99284 EMERGENCY DEPT VISIT MOD MDM: CPT | Mod: 25

## 2020-06-24 PROCEDURE — 12011 RPR F/E/E/N/L/M 2.5 CM/<: CPT

## 2020-06-24 RX ORDER — CLINDAMYCIN HYDROCHLORIDE 150 MG/1
300 CAPSULE ORAL EVERY 8 HOURS
Qty: 42 CAPSULE | Refills: 0 | Status: SHIPPED | OUTPATIENT
Start: 2020-06-24 | End: 2020-07-01

## 2020-06-24 RX ADMIN — Medication 1 ML: at 06:06

## 2020-06-25 NOTE — ED PROVIDER NOTES
HISTORY     Chief Complaint   Patient presents with    Fall     sent from  for further eval, laceration to the left eye brow     Review of patient's allergies indicates:   Allergen Reactions    Penicillins         HPI   Patient fell out wheelchair causing injury. No other injuries noted.     The history is provided by a caregiver.   Laceration   The incident occurred 2 to 3 hours ago. The laceration is located on the face. The laceration is 1 cm in size. The laceration mechanism is unknown.The pain is at a severity of 0/10. He reports no foreign bodies present. His tetanus status is UTD.        PCP: Josemanuel Pichardo Jr, MD     Past Medical History:  Past Medical History:   Diagnosis Date    Bipolar 1 disorder     Developmental delay     Mental disorder     Mental retardation     Movement disorder     Seizures         Past Surgical History:  History reviewed. No pertinent surgical history.     Family History:  History reviewed. No pertinent family history.     Social History:  Social History     Tobacco Use    Smoking status: Never Smoker    Smokeless tobacco: Never Used   Substance and Sexual Activity    Alcohol use: No    Drug use: No    Sexual activity: Not on file         ROS   Review of Systems   Constitutional: Negative for fever.   HENT: Negative for sore throat.         Facial injury with laceration to left eyebrow    Respiratory: Negative for shortness of breath.    Cardiovascular: Negative for chest pain.   Gastrointestinal: Negative for nausea.   Genitourinary: Negative for dysuria.   Musculoskeletal: Negative for back pain.   Skin: Negative for rash.   Neurological: Negative for weakness.   Hematological: Does not bruise/bleed easily.       PHYSICAL EXAM     Initial Vitals   BP Pulse Resp Temp SpO2   06/24/20 1803 06/24/20 1803 06/24/20 1803 06/24/20 1811 06/24/20 1803   (!) 134/90 93 18 98.4 °F (36.9 °C) 95 %      MAP       --                  Physical Exam    Constitutional: He  "appears well-developed and well-nourished. No distress.   HENT:   Head: Normocephalic and atraumatic.   Eyes: Conjunctivae are normal. Pupils are equal, round, and reactive to light.       Neck: Normal range of motion. Neck supple.   Cardiovascular: Normal rate, regular rhythm and normal heart sounds.   Pulmonary/Chest: Breath sounds normal.   Abdominal: Soft. Bowel sounds are normal.   Musculoskeletal: Normal range of motion.   Neurological: He is alert and oriented to person, place, and time. No cranial nerve deficit.   Skin: Skin is warm and dry.   Psychiatric: He has a normal mood and affect.          ED COURSE   Lac Repair    Date/Time: 6/24/2020 9:00 PM  Performed by: Ezequiel Leigh NP  Authorized by: Hesham Villasenor Jr., MD   Body area: head/neck  Location details: left eyebrow  Laceration length: 1 cm  Anesthesia: local infiltration    Anesthesia:  Local Anesthetic: LET (lido,epi,tetracaine)  Preparation: Patient was prepped and draped in the usual sterile fashion.  Irrigation method: syringe  Amount of cleaning: standard  Degree of undermining: none  Skin closure: glue and Steri-Strips  Approximation: close  Approximation difficulty: simple  Patient tolerance: Patient tolerated the procedure well with no immediate complications        ED ONGOING VITALS:  Vitals:    06/24/20 1803 06/24/20 1807 06/24/20 1811 06/24/20 2005   BP: (!) 134/90   (!) 150/88   Pulse: 93   82   Resp: 18   16   Temp:   98.4 °F (36.9 °C)    TempSrc: Temporal  Oral    SpO2: 95%   97%   Weight:  74.4 kg (164 lb)     Height: 5' 5" (1.651 m)            ABNORMAL LAB VALUES:  Labs Reviewed - No data to display      ALL LAB VALUES:      RADIOLOGY STUDIES:  Imaging Results          CT Maxillofacial Without Contrast (Final result)  Result time 06/24/20 19:04:01    Final result by Singh Doss MD (06/24/20 19:04:01)                 Impression:      Negative for fracture.    Preseptal soft tissue swelling and hematoma.  Mild postseptal edema " suspected which produces mild proptosis.    All CT scans at this facility use dose modulation, iterative reconstruction and/or weight based dosing when appropriate to reduce radiation dose to as low as reasonably achievable.      Electronically signed by: Singh Doss MD  Date:    06/24/2020  Time:    19:04             Narrative:    EXAMINATION:  CT MAXILLOFACIAL WITHOUT CONTRAST    CLINICAL HISTORY:  Facial trauma;    TECHNIQUE:  Axial CT images through the facial bones were obtained without contrast.    COMPARISON:  None    FINDINGS:  Left preseptal periorbital edema and hematoma.  Small laceration suspected.  Orbit appears grossly intact.  Mild postseptal edema seen inferior to the orbit.  Mild proptosis is noted of the left globe thought to likely related to postseptal edema.    Osseus structures demonstrate no evidence of fracture or destructive lesion.    Orbits are intact.    Mild mucosal thickening within the paranasal sinuses.  Polyp or retention cyst seen within the maxillary sinuses.    Advanced degenerative changes of the cervical spine noted.  Moderate bilateral neural foraminal narrowing seen at C3/4 and C4/5 bilaterally.                                          The above vital signs and test results have been reviewed by the emergency provider.     ED Medications:  Discharge Medication List as of 6/24/2020  7:42 PM      START taking these medications    Details   clindamycin (CLEOCIN) 150 MG capsule Take 2 capsules (300 mg total) by mouth every 8 (eight) hours. for 7 days, Starting Wed 6/24/2020, Until Wed 7/1/2020, Print           Discharge Medications:  Discharge Medication List as of 6/24/2020  7:42 PM      START taking these medications    Details   clindamycin (CLEOCIN) 150 MG capsule Take 2 capsules (300 mg total) by mouth every 8 (eight) hours. for 7 days, Starting Wed 6/24/2020, Until Wed 7/1/2020, Print            Follow-up Information     Schedule an appointment as soon as possible for a  visit  with Josemanuel Pichadro Jr, MD.    Specialty: Internal Medicine  Contact information:  [address] [place] [address]  [phone]             [facility] - .    Specialty: Emergency Medicine  Why: As needed, If symptoms worsen  Contact information:  [address]  [place] [address] [phone]                I discussed with patient and/or family/caretaker that evaluation in the ED does not suggest any emergent or life threatening medical conditions requiring immediate intervention beyond what was provided in the ED, and I believe patient is safe for discharge. Regardless, an unremarkable evaluation in the ED does not preclude the development or presence of a serious or life threatening condition. As such, patient was instructed to return immediately for any worsening or change in current symptoms.    I discussed wound care precautions with patient and/or family/caretaker; specifically that all wounds have risk of infection despite efforts to cleanse and debride the wound; and there is a risk of an occult foreign body (and thus increased risk of infection) despite a negative examination.  I discussed with patient need to return for any signs of infection, specifically redness, increased pain, fever, drainage of pus, or any concern, immediately.      MEDICAL DECISION MAKING                 CLINICAL IMPRESSION       ICD-10-CM ICD-9-CM   1. Fall, initial encounter  W19.XXXA E888.9   2. Facial injury, initial encounter  S09.93XA 959.09   3. Laceration of left eyebrow, initial encounter  S01.112A 873.42       Disposition:   Disposition: Discharged  Condition: Stable         Ezequiel Leigh NP  06/25/20 0148